# Patient Record
(demographics unavailable — no encounter records)

---

## 2025-06-12 NOTE — HISTORY OF PRESENT ILLNESS
[Patient reported bone density results were abnormal] : Patient reported bone density results were abnormal [Currently In Menopause] : currently in menopause [Previously active] : previously active [TextBox_4] : Celia is a 61 y/o  who presents today for an annual exam. She is s/p a hysterectomy in 2013 and has been on oral estrogen therapy for 5+ years.  She states she never experienced VMS, only vaginal dryness. she denies any vaginal bleeding [Mammogramdate] : 8/10/24 [BoneDensityDate] : 8/10/24 [TextBox_37] : osteopenia [ColonoscopyDate] : 2024

## 2025-06-12 NOTE — DISCUSSION/SUMMARY
[FreeTextEntry1] : 1) pap performed; discharge consistent with BV noted, vaginal culture obtained 2) discussed systemic estrogen therapy vs. vaginal. pt with vaginal c/o NOT systemic. Discussed switching from oral estradiol to vaginal; options discussed; pill, cream, ring. Will start with cream and pt to d/c oral estradiol 3) rx for screening mammo issued 4) pt up to date with DEXA and colonoscopy  REturn to office in one year

## 2025-06-12 NOTE — PHYSICAL EXAM
[Appropriately responsive] : appropriately responsive [Alert] : alert [No Acute Distress] : no acute distress [No Lymphadenopathy] : no lymphadenopathy [Oriented x3] : oriented x3 [Examination Of The Breasts] : a normal appearance [No Discharge] : no discharge [No Masses] : no breast masses were palpable [Labia Majora] : normal [Labia Minora] : normal [Normal] : normal [Uterine Adnexae] : normal [FreeTextEntry4] : +discharge consistent with BV